# Patient Record
Sex: MALE | Race: OTHER | Employment: UNEMPLOYED | URBAN - METROPOLITAN AREA
[De-identification: names, ages, dates, MRNs, and addresses within clinical notes are randomized per-mention and may not be internally consistent; named-entity substitution may affect disease eponyms.]

---

## 2019-08-29 PROBLEM — F90.2 ATTENTION DEFICIT HYPERACTIVITY DISORDER (ADHD), COMBINED TYPE: Status: ACTIVE | Noted: 2019-08-29

## 2019-09-18 ENCOUNTER — TELEPHONE (OUTPATIENT)
Dept: INTERNAL MEDICINE CLINIC | Facility: CLINIC | Age: 34
End: 2019-09-18

## 2019-09-18 NOTE — TELEPHONE ENCOUNTER
Faxed # [phone] over a medical records release over to [facility] in [place], [place]

## 2019-09-28 PROBLEM — F98.8 ATTENTION DEFICIT DISORDER (ADD) WITHOUT HYPERACTIVITY: Status: ACTIVE | Noted: 2019-09-28

## 2019-09-28 PROBLEM — F90.2 ATTENTION DEFICIT HYPERACTIVITY DISORDER (ADHD), COMBINED TYPE: Status: RESOLVED | Noted: 2019-08-29 | Resolved: 2019-09-28

## 2019-09-28 NOTE — PROGRESS NOTES
CC: ADHD (Np presents to clinic to establish care-->needs refill on Adderrall. )      Hx of CC:  BECOMING ESTABLISHED. H/O ADD-->CURRENTLY ON ADDERALL 30 MG DURING WORKING DAYS & HAS 2 MORE PRESCRIPTIONS LEFT.       PMHX:  ADD (DENIES HYPERACTIVITY)  PSHX

## 2019-12-13 NOTE — PROGRESS NOTES
CC:  Medication Follow-Up (Pt presents to clinic for medication f/up and refill. )      Hx of CC:  F/up on Adhd. Doing well on regular adderall-->the XR version was a lot more expensive.       Vitals:    12/12/19  1147   BP: 122/76   Pulse: 100   SpO2: 98%

## 2020-03-17 DIAGNOSIS — F90.2 ATTENTION DEFICIT HYPERACTIVITY DISORDER (ADHD), COMBINED TYPE: ICD-10-CM

## 2020-03-17 RX ORDER — DEXTROAMPHETAMINE SACCHARATE, AMPHETAMINE ASPARTATE, DEXTROAMPHETAMINE SULFATE AND AMPHETAMINE SULFATE 7.5; 7.5; 7.5; 7.5 MG/1; MG/1; MG/1; MG/1
30 TABLET ORAL DAILY
Qty: 30 TABLET | Refills: 0 | OUTPATIENT
Start: 2020-03-17 | End: 2020-04-16

## 2020-03-18 NOTE — TELEPHONE ENCOUNTER
Pt called the office this morning requesting refill of Adderall 30MG to be sent to Pharmacy on file.

## 2020-03-19 DIAGNOSIS — F90.2 ATTENTION DEFICIT HYPERACTIVITY DISORDER (ADHD), COMBINED TYPE: ICD-10-CM

## 2020-03-19 RX ORDER — DEXTROAMPHETAMINE SACCHARATE, AMPHETAMINE ASPARTATE, DEXTROAMPHETAMINE SULFATE AND AMPHETAMINE SULFATE 7.5; 7.5; 7.5; 7.5 MG/1; MG/1; MG/1; MG/1
30 TABLET ORAL DAILY
Qty: 30 TABLET | Refills: 0 | Status: SHIPPED | OUTPATIENT
Start: 2020-03-19 | End: 2020-03-19 | Stop reason: CLARIF

## 2020-03-19 RX ORDER — DEXTROAMPHETAMINE SACCHARATE, AMPHETAMINE ASPARTATE, DEXTROAMPHETAMINE SULFATE AND AMPHETAMINE SULFATE 7.5; 7.5; 7.5; 7.5 MG/1; MG/1; MG/1; MG/1
30 TABLET ORAL DAILY
Qty: 20 TABLET | Refills: 0 | Status: SHIPPED | OUTPATIENT
Start: 2020-03-19 | End: 2020-04-03

## 2020-04-03 NOTE — PROGRESS NOTES
CC:  Medication Request (pt in for medication refills. )      Hx of CC:  F/UP ON ADHD--STILL WORKING AT [employer] BUT DOING CARRY OUT BUSINESS ONLY. DOING WELL ON ADDERALL, NO SIDE EFFECTS. ABLE TO FOCUS & CARRY OUT TASKS.     Vitals:    04/03/20  0822

## 2020-07-06 NOTE — TELEPHONE ENCOUNTER
Patient walked into the office requesting a refill on amphetamine-dextroamphetamine 30 mg. Patient has an appointment on 07/17/20, but is currently out of the medication. Pt is waiting in the lobby of the office for the prescription.

## 2020-07-17 NOTE — PROGRESS NOTES
CC:  Follow - Up (medication f/u )      Hx of CC:  ROUTINE F/UP ON ADD--DOING OK ON ADDERALL--> INTERESTED IN XR VERSION BUT WILL FIND OUT COST (OUT OF POCKET)  NO MED SIDE EFFECTS    Vitals:    07/17/20  1053   BP: 134/82   Pulse: 84   SpO2: 98%   Weight:

## 2020-07-18 ENCOUNTER — NURSE ONLY (OUTPATIENT)
Dept: INTERNAL MEDICINE CLINIC | Facility: CLINIC | Age: 35
End: 2020-07-18

## 2020-07-18 PROCEDURE — 36415 COLL VENOUS BLD VENIPUNCTURE: CPT | Performed by: FAMILY MEDICINE

## 2020-07-18 NOTE — PROGRESS NOTES
Pt presented to clinic today for blood draw. Per physician able to draw orders. Orders  documented within chart. Pt tolerated lab draw well.  verified.   Orders drawn include: CMP and Lipid  Site of draw: right arm

## 2020-10-12 NOTE — PROGRESS NOTES
CC: ADHD (Pt presents to clinic for medication refill. 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 DOING WELL ON CURRENT ADDERALL. DOES NOT GET XR FORMULATION DUE TO COST (NO INSURANCE).     Vitals:    10/12/20

## 2021-01-11 NOTE — PROGRESS NOTES
PATIENT IDENTIFICATION  Name: Kamilah Harrell  MRN: [id number]    Diagnoses:   Attention deficit hyperactivity disorder (ADHD), combined type  (primary encounter diagnosis)  Need for influenza vaccination    SUBJECTIVE:  Kamilah Harrell is a disorder (ADHD), combined type  - amphetamine-dextroamphetamine (ADDERALL) 30 MG Oral Tab; Take 1 tablet (30 mg total) by mouth daily. Dispense: 30 tablet; Refill: 0    2. Need for influenza vaccination  - INFLUENZA REFUSED [facility]  1.  Patient given one month

## 2021-02-08 NOTE — PROGRESS NOTES
HPI:    Patient ID: Maryan Escobedo is a 38year old male. HPI Pt here for a general fu on Add with hyperactivity. Has been treated since adolescence. Has been doing well without anorexia  Or insomnia. [employer].     Review of Systems

## 2021-05-10 NOTE — PROGRESS NOTES
HPI/Subjective:   Patient ID: Vida Rosado is a 38year old male. HPIPt here for a fu on ADD - has been doing well with Adderal rx - takes some vacations on weekends. Has no insomnia and apetitie problems.     History/Other:   Review of System

## 2021-11-29 NOTE — PROGRESS NOTES
Subjective:   Patient ID: Cecilia Duarte is a 43year old male. Medication Follow-Up    ADHD    Patient here for a fu on ADHD. Did not do well with XR medication. Is working 12 hr days as a . Has good attention span.     Hist

## 2022-01-28 RX ORDER — DEXTROAMPHETAMINE SACCHARATE, AMPHETAMINE ASPARTATE, DEXTROAMPHETAMINE SULFATE AND AMPHETAMINE SULFATE 7.5; 7.5; 7.5; 7.5 MG/1; MG/1; MG/1; MG/1
30 TABLET ORAL 2 TIMES DAILY
Qty: 60 TABLET | Refills: 0 | Status: SHIPPED | OUTPATIENT
Start: 2022-01-28 | End: 2022-02-27

## 2022-02-22 ENCOUNTER — TELEPHONE (OUTPATIENT)
Dept: INTERNAL MEDICINE CLINIC | Facility: CLINIC | Age: 37
End: 2022-02-22

## 2022-02-22 NOTE — TELEPHONE ENCOUNTER
amphetamine-dextroamphetamine (ADDERALL) 30 MG Oral Tab    Alex is questioning the dosage for the pt:  1 tablet once a day or 1 tablet twice a day.

## 2022-05-27 NOTE — PROGRESS NOTES
Subjective:   Patient ID: Joshua Leung is a 42year old male. ADHD    Pt here for 3 month fu on ADD medications. No side effects or problems. Is able to perform job functions without any problem. History/Other:   Review of Systems   Psychiatric/Behavioral: Positive for decreased concentration. Negative for agitation and behavioral problems. No current outpatient medications on file. Allergies:No Known Allergies    Objective:   Physical Exam  Constitutional:       Appearance: He is normal weight. Eyes:      General: No scleral icterus. Pupils: Pupils are equal, round, and reactive to light. Cardiovascular:      Rate and Rhythm: Normal rate and regular rhythm. Pulmonary:      Effort: Pulmonary effort is normal.      Breath sounds: Normal breath sounds. Musculoskeletal:      Right lower leg: No edema. Left lower leg: No edema. Lymphadenopathy:      Cervical: No cervical adenopathy. Neurological:      Mental Status: He is alert. Mental status is at baseline. Psychiatric:         Thought Content: Thought content normal.         Assessment & Plan:   Attention deficit hyperactivity disorder (ADHD), combined type  (primary encounter diagnosis) continue Adderal 30mg bid    No orders of the defined types were placed in this encounter.       Meds This Visit:  Requested Prescriptions      No prescriptions requested or ordered in this encounter       Imaging & Referrals:  None

## 2022-06-27 ENCOUNTER — TELEPHONE (OUTPATIENT)
Dept: INTERNAL MEDICINE CLINIC | Facility: CLINIC | Age: 37
End: 2022-06-27

## 2022-06-27 RX ORDER — DEXTROAMPHETAMINE SACCHARATE, AMPHETAMINE ASPARTATE, DEXTROAMPHETAMINE SULFATE AND AMPHETAMINE SULFATE 7.5; 7.5; 7.5; 7.5 MG/1; MG/1; MG/1; MG/1
30 TABLET ORAL 2 TIMES DAILY
Qty: 60 TABLET | Refills: 0 | Status: CANCELLED | OUTPATIENT
Start: 2022-06-27 | End: 2022-07-27

## 2022-06-27 RX ORDER — DEXTROAMPHETAMINE SACCHARATE, AMPHETAMINE ASPARTATE, DEXTROAMPHETAMINE SULFATE AND AMPHETAMINE SULFATE 7.5; 7.5; 7.5; 7.5 MG/1; MG/1; MG/1; MG/1
30 TABLET ORAL 2 TIMES DAILY
Qty: 60 TABLET | Refills: 0 | Status: SHIPPED | OUTPATIENT
Start: 2022-06-27 | End: 2022-07-27

## 2022-06-27 NOTE — TELEPHONE ENCOUNTER
Patient called, asking if the Adderall medication that Dr. Favio Garcia sent to [facility] could please be resent to [facility]. [facility] is out of stock of the medication and they don't know when a new shipment will come in.

## 2022-06-27 NOTE — TELEPHONE ENCOUNTER
Pt walked in and asked if he can send all his refills from now on to [facility]. He would like it the medication Adderall could be forward to [address]. Pt states he will be going out of town tomorrow and would need the to  the medication today.

## 2022-07-26 RX ORDER — DEXTROAMPHETAMINE SACCHARATE, AMPHETAMINE ASPARTATE, DEXTROAMPHETAMINE SULFATE AND AMPHETAMINE SULFATE 7.5; 7.5; 7.5; 7.5 MG/1; MG/1; MG/1; MG/1
30 TABLET ORAL 2 TIMES DAILY
Qty: 60 TABLET | Refills: 0 | Status: SHIPPED | OUTPATIENT
Start: 2022-07-26 | End: 2022-08-25

## 2022-08-23 ENCOUNTER — HOSPITAL ENCOUNTER (OUTPATIENT)
Age: 37
Discharge: HOME | End: 2022-08-23
Payer: COMMERCIAL

## 2022-08-23 VITALS
BODY MASS INDEX: 28.7 KG/M2 | OXYGEN SATURATION: 100 % | TEMPERATURE: 98 F | HEART RATE: 73 BPM | SYSTOLIC BLOOD PRESSURE: 127 MMHG | HEIGHT: 71 IN | DIASTOLIC BLOOD PRESSURE: 73 MMHG | WEIGHT: 205 LBS | RESPIRATION RATE: 18 BRPM

## 2022-08-23 DIAGNOSIS — K40.90 UNILATERAL INGUINAL HERNIA WITHOUT OBSTRUCTION OR GANGRENE, RECURRENCE NOT SPECIFIED: Primary | ICD-10-CM

## 2022-08-23 PROCEDURE — 99203 OFFICE O/P NEW LOW 30 MIN: CPT | Performed by: PHYSICIAN ASSISTANT

## 2022-08-23 NOTE — ED INITIAL ASSESSMENT (HPI)
Pt presents to the IC with SO with c/o lower left abdominal pain x 1 week. Spoke with PCP on phone who states it sounds like hernia. Pt states pain has worsened, can't wait for his appointment on Friday.

## 2022-08-26 ENCOUNTER — OFFICE VISIT (OUTPATIENT)
Dept: INTERNAL MEDICINE CLINIC | Facility: CLINIC | Age: 37
End: 2022-08-26
Payer: COMMERCIAL

## 2022-08-26 VITALS
HEART RATE: 79 BPM | BODY MASS INDEX: 29.82 KG/M2 | OXYGEN SATURATION: 99 % | SYSTOLIC BLOOD PRESSURE: 110 MMHG | WEIGHT: 213 LBS | HEIGHT: 71 IN | DIASTOLIC BLOOD PRESSURE: 60 MMHG

## 2022-08-26 DIAGNOSIS — K40.90 NON-RECURRENT UNILATERAL INGUINAL HERNIA WITHOUT OBSTRUCTION OR GANGRENE: Primary | ICD-10-CM

## 2022-08-26 DIAGNOSIS — F90.2 ATTENTION DEFICIT HYPERACTIVITY DISORDER (ADHD), COMBINED TYPE: ICD-10-CM

## 2022-08-26 PROCEDURE — 3008F BODY MASS INDEX DOCD: CPT | Performed by: INTERNAL MEDICINE

## 2022-08-26 PROCEDURE — 3074F SYST BP LT 130 MM HG: CPT | Performed by: INTERNAL MEDICINE

## 2022-08-26 PROCEDURE — 99214 OFFICE O/P EST MOD 30 MIN: CPT | Performed by: INTERNAL MEDICINE

## 2022-08-26 PROCEDURE — 3078F DIAST BP <80 MM HG: CPT | Performed by: INTERNAL MEDICINE

## 2022-08-26 RX ORDER — DEXTROAMPHETAMINE SACCHARATE, AMPHETAMINE ASPARTATE, DEXTROAMPHETAMINE SULFATE AND AMPHETAMINE SULFATE 5; 5; 5; 5 MG/1; MG/1; MG/1; MG/1
20 TABLET ORAL 2 TIMES DAILY
Qty: 60 TABLET | Refills: 0 | Status: SHIPPED | OUTPATIENT
Start: 2022-09-26 | End: 2022-08-26 | Stop reason: CLARIF

## 2022-08-26 RX ORDER — DEXTROAMPHETAMINE SACCHARATE, AMPHETAMINE ASPARTATE, DEXTROAMPHETAMINE SULFATE AND AMPHETAMINE SULFATE 5; 5; 5; 5 MG/1; MG/1; MG/1; MG/1
20 TABLET ORAL 2 TIMES DAILY
Qty: 60 TABLET | Refills: 0 | Status: SHIPPED | OUTPATIENT
Start: 2022-08-26 | End: 2022-08-26 | Stop reason: CLARIF

## 2022-08-26 RX ORDER — DEXTROAMPHETAMINE SACCHARATE, AMPHETAMINE ASPARTATE, DEXTROAMPHETAMINE SULFATE AND AMPHETAMINE SULFATE 5; 5; 5; 5 MG/1; MG/1; MG/1; MG/1
20 TABLET ORAL 2 TIMES DAILY
Qty: 60 TABLET | Refills: 0 | Status: SHIPPED | OUTPATIENT
Start: 2022-10-27 | End: 2022-08-26 | Stop reason: CLARIF

## 2022-09-13 NOTE — TELEPHONE ENCOUNTER
Patient's fiance called. She saw Dr. Jakub Batista yesterday. During the visit she explained she gets yeast infections. Dr. Jakub Batista was going to send medication for her fiance because of her yeast infections. Medication Fluconazole. To the [facility] in [address].    They checked and no medication was sent.

## 2023-04-05 NOTE — PROGRESS NOTES
Pt presented to clinic today for blood draw. Per physician able to draw orders. Orders  documented within chart. Pt tolerated lab draw well.  verified.   Orders drawn include: CBC, Lipid, and CMP  Site of draw: right arm

## 2023-05-16 NOTE — TELEPHONE ENCOUNTER
Patient was rescheduled from a Dr. Yonis Salas appointment on Monday, May 15th 8:00am (rescheduled to 07/17/23). He was told he could have his Adderal 30mg refilled. No medications are listed in his chart.     If Adderal can be prescribed, please send to:      [address] [facility] [phone], [phone]    Any questions or concerns, please call patient at:    [phone]    KG

## 2023-06-22 NOTE — PATIENT INSTRUCTIONS
#1 allergic rhinitis  See above skin testing to screen for environmental triggers and allergies. Typically worse in the spring. Okay in the summer and fall  Patient was using Afrin and Zyrtec with room for improvement. This was the worst year for his allergies over the past 15 years. Patient considering immunotherapy handouts on immunotherapy and avoidance measures provided and reviewed  Trial of Xyzal, levocetirizine 5 mg once a day as a nonsedating antihistamine  Flonase 2 sprays per nostril as an intranasal steroid spray. Best use every day and can take up to 3 to 5 days to take full effect  Patient may call for prescription in early spring or purchase over-the-counter    #2 rhinitis medicamentosa  Handouts provided and reviewed  Recommend to avoid Afrin or nasal decongestant sprays that contain oxymetazoline as it can cause rebound congestion neck symptoms worse  Trial of Flonase 2 sprays per nostril once a day for allergy symptoms present    #3 COVID vaccines recommended.     #4 flu vaccine in the fall recommended

## 2023-06-22 NOTE — TELEPHONE ENCOUNTER
May we have immunotherapy orders for patient?   Testing showed to trees, grasses, ragweed, and weeds in office today  First allergy injection scheduled for 07/05/23

## 2023-08-14 NOTE — TELEPHONE ENCOUNTER
A refill request was received for:  Requested Prescriptions     Pending Prescriptions Disp Refills    amphetamine-dextroamphetamine (ADDERALL) 30 MG Oral Tab 60 tablet 0     Sig: Take 1 tablet (30 mg total) by mouth 2 (two) times daily.      Last refill date:  7/18/23    Last office visit: 5/15/23      Future Appointments   Date Time Provider Alesha Reid   8/16/2023  8:40 AM [facility]   8/23/2023  8:20 AM [facility]   8/25/2023  8:30 AM MD MARIA ELENA [address]   8/30/2023  8:30 AM [facility] Torrie Vergara

## 2023-12-02 NOTE — TELEPHONE ENCOUNTER
Pt came into office and apologized for missing his previous appt, he made an appt for 1/12/24 and asked for a refill of his     amphetamine-dextroamphetamine (ADDERALL) 30 MG Oral Tab  60 tablet     [id number] - [address] [phone], [phone]

## 2023-12-06 NOTE — TELEPHONE ENCOUNTER
Patient in office today for allergy injection   Requesting refill for Xyzal 5 mg  LOV 6/22/23   Refill filled per protocol

## 2023-12-11 NOTE — ASSESSMENT & PLAN NOTE
Patient with a recent medial meniscus tear. He is following with orthopedic surgery and has plans for repair. Pain is overall controlled at this time.

## 2023-12-11 NOTE — PATIENT INSTRUCTIONS
PATIENT INSTRUCTIONS    Thank you for seeing me today, it was a pleasure taking care of you. Please check out at the  and schedule a follow up appointment. Return if symptoms worsen or fail to improve. Please remember that the kinsey period for all appointments is 5 minutes. This is to help maximize the amount of time that we can spend together at our visits.     For your knee, continue to follow with orthopedic specialist  Blood work today - I will call you when everything is back   I will fax everything to surgery office when we get it all back    Best,  Dr. Alfonso Raw

## 2024-03-02 NOTE — TELEPHONE ENCOUNTER
Received refill request for Xyzal 5 mg- 1 tablet by mouth daily.     Last office visit was 6/22/23 for allergies.    Refill passed protocol- refilled.

## 2024-03-04 NOTE — PROGRESS NOTES
Subjective:   Jerrell Biswas is a 38 year old male who presents for Physical     Patioent here for a wellness exam and fu on adult ADD.  Is having left knee issues and left shoulder pain. Add is under good control  History/Other:    Chief Complaint Reviewed and Verified  No Further Nursing Notes to   Review  Medications Reviewed  Problem List Reviewed         Tobacco:  He has never smoked tobacco.    Current Outpatient Medications   Medication Sig Dispense Refill    LEVOCETIRIZINE 5 MG Oral Tab TAKE ONE TABLET BY MOUTH IN THE EVENING 90 tablet 0    amphetamine-dextroamphetamine (ADDERALL) 30 MG Oral Tab Take 1 tablet (30 mg total) by mouth 2 (two) times daily. 60 tablet 0    [START ON 3/14/2024] amphetamine-dextroamphetamine (ADDERALL) 30 MG Oral Tab Take 1 tablet (30 mg total) by mouth 2 (two) times daily. 60 tablet 0    Testosterone Cypionate Does not apply Powder       amphetamine-dextroamphetamine (ADDERALL) 30 MG Oral Tab Take 1 tablet (30 mg total) by mouth 2 (two) times daily. 60 tablet 0    fluticasone propionate 50 MCG/ACT Nasal Suspension 2 sprays by Nasal route daily. 3 each 0         Review of Systems:  Review of Systems   Musculoskeletal:  Positive for arthralgias (knee pain and left shoulder).         Objective:   /80   Ht 5' 11\" (1.803 m)   Wt 226 lb (102.5 kg)   BMI 31.52 kg/m²  Estimated body mass index is 31.52 kg/m² as calculated from the following:    Height as of this encounter: 5' 11\" (1.803 m).    Weight as of this encounter: 226 lb (102.5 kg).  Physical Exam  Constitutional:       Appearance: He is normal weight.   HENT:      Head: Normocephalic and atraumatic.      Right Ear: Ear canal normal.      Left Ear: Ear canal normal.   Eyes:      General: No scleral icterus.     Pupils: Pupils are equal, round, and reactive to light.   Cardiovascular:      Rate and Rhythm: Normal rate and regular rhythm.   Pulmonary:      Effort: Pulmonary effort is normal.      Breath sounds: Normal  breath sounds.   Abdominal:      Palpations: Abdomen is soft.      Tenderness: There is no abdominal tenderness.   Musculoskeletal:      Cervical back: Neck supple.      Right lower leg: No edema.      Left lower leg: No edema.   Skin:     Findings: No lesion.   Neurological:      Mental Status: He is alert. Mental status is at baseline.   Psychiatric:         Thought Content: Thought content normal.           Assessment & Plan:   1. Wellness examination (Primary) check fasting labs  2. Attention deficit hyperactivity disorder (ADHD), predominantly inattentive type stable on     Medication.    No follow-ups on file.    Ling Figueroa MD, 3/4/2024, 8:11 AM

## 2024-04-29 NOTE — TELEPHONE ENCOUNTER
[facility] called with a question regarding the April prescription for the following medication:    amphetamine-dextroamphetamine (ADDERALL) 30 MG Oral Tab     The April prescription does not specify the dosing of \"2 (two) times daily\".    If this medication should be twice daily, please send a new prescription to:    [id number] - [address] [phone], [phone]     Any questions or concerns, please call Christa, Pharmacist, at [facility]:    [phone]     KG

## 2024-08-24 NOTE — TELEPHONE ENCOUNTER
Patient last seen in Allergy 6/22/2023 for . . .    Seasonal and perennial allergic rhinoconjunctivitis  (primary encounter diagnosis)  Covid-19 vaccine series declined  Flu vaccine need  Rhinitis medicamentosa    Requested Prescriptions   Pending Prescriptions Disp Refills    LEVOCETIRIZINE 5 MG Oral Tab [Pharmacy Med Name: Levocetirizine Dihydrochloride 5 Mg Tab Teva] 90 tablet 0     Sig: TAKE ONE TABLET BY MOUTH IN THE EVENING       Antihistamines Passed - 8/24/2024  9:25 AM        Passed - Appt in past 12 mos or next 1 mos     Recent Outpatient Visits              2 months ago Attention deficit hyperactivity disorder (ADHD), predominantly inattentive type    [facility] Ling Figueroa MD    Office Visit    4 months ago Environmental and seasonal allergies    [facility]    Nurse Only    4 months ago Environmental and seasonal allergies    [facility]    Nurse Only    5 months ago Environmental and seasonal allergies [J30.89]    [facility]    Nurse Only    5 months ago Wellness examination    [facility] Ling Figueroa MD    Office Visit          Future Appointments         Provider Department Appt Notes    In 2 weeks Spencer Voss MD [facility]     In 1 month Ling Figueroa MD [facility] Three month follow up from 06/03/2024 visit    KG                       LEVOCETIRIZINE 5 MG Oral Tab 90 tablet 0 8/24/2024 --    Sig - Route: TAKE ONE TABLET BY MOUTH IN THE EVENING - Oral    Sent to pharmacy as: Levocetirizine Dihydrochloride 5 MG Oral Tablet (Xyzal)    E-Prescribing Status: Receipt confirmed by pharmacy (8/24/2024 10:53 AM CDT)      Pharmacy    [id number] - [address]  [phone], [phone]     Prescription as above sent to pharmacy per protocol.

## 2024-08-24 NOTE — TELEPHONE ENCOUNTER
A refill request was received for:  Requested Prescriptions     Pending Prescriptions Disp Refills    amphetamine-dextroamphetamine (ADDERALL) 30 MG Oral Tab 60 tablet 0     Sig: Take 1 tablet (30 mg total) by mouth 2 (two) times daily.     Last refill date:  8/4/24    Last office visit: 8/4/24      Future Appointments   Date Time Provider Department Center   9/10/2024 11:30 AM Spencer Voss MD [facility]   9/24/2024 12:30 PM Ling Figueroa MD [facility] [address]

## 2024-10-28 NOTE — TELEPHONE ENCOUNTER
A refill request was received for:  Requested Prescriptions     Pending Prescriptions Disp Refills    AMPHETAMINE-DEXTROAMPHETAMINE 30 MG Oral Tab [Pharmacy Med Name: Amphetamine/Dextroamphetamine 30 Mg Tab Nort] 60 tablet 0     Sig: Take 1 tablet (30 mg total) by mouth 2 (two) times daily.     Last refill date:  11/25/24    Last office visit: 9/24/24      Future Appointments   Date Time Provider Department Center   12/23/2024  8:00 AM Ling Figueroa MD [facility] [address]

## 2025-05-14 NOTE — PATIENT INSTRUCTIONS
1. Gargle throat with 1 tsp (5 g) of salt dissolved in 8 fl oz (240 mL) of warm water.  You may also drink warm broth/soup, or tea with honey  2. Drink plenty of fluids and get plenty of rest.   3. You may use throat lozenges, acetaminophen, or ibuprofen for pain and fever.    4. Continue daily Xyzal and Flonase  5. Follow up with primary care physician in 1-2 weeks or sooner if needed.  6. Seek medical attention sooner for worsening of symptoms despite treatment efforts, or the emergency room for the following non inclusive list of symptoms: uncontrolled fever/pain, inability to keep fluids down, shortness of breath or respiratory distress.  7.  We will notify you of throat culture results in the next few days  8. Take steroid pack as prescribed with food

## 2025-05-14 NOTE — PROGRESS NOTES
Subjective:   Patient ID: Jerrell Biswas is a 40 year old male.    Patient is a 40 year old male who presents today with complaints of sore throat, chills and swollen uvula x this morning. Wife notes he does snore. Denies runny nose, nasal congestion, cough, ear pain, fever, body aches, rashes, n/v/d, abdominal pain, headaches. Tolerating PO well at home. Attempted treatment prior to arrival = antihistamine. No ill contacts he can identify. + hx allergies. Needs refill on Flonase and Xyzal.        History/Other:   Review of Systems   Constitutional:  Positive for chills. Negative for appetite change and fever.   HENT:  Positive for sore throat. Negative for congestion, ear pain and rhinorrhea.    Respiratory:  Negative for cough.    Gastrointestinal:  Negative for abdominal pain, diarrhea, nausea and vomiting.   Musculoskeletal:  Negative for myalgias.   Skin:  Negative for rash.   Neurological:  Negative for headaches.     Current Medications[1]  Allergies:Allergies[2]    /62   Pulse 74   Temp 97.5 °F (36.4 °C)   Resp 18   Ht 5' 11\" (1.803 m)   Wt 227 lb (103 kg)   SpO2 96%   BMI 31.66 kg/m²       Objective:   Physical Exam  Vitals reviewed.   Constitutional:       General: He is awake. He is not in acute distress.     Appearance: Normal appearance. He is well-developed and well-groomed. He is not ill-appearing, toxic-appearing or diaphoretic.   HENT:      Head: Normocephalic and atraumatic.      Right Ear: Tympanic membrane, ear canal and external ear normal.      Left Ear: Tympanic membrane, ear canal and external ear normal.      Nose: Nose normal.      Mouth/Throat:      Lips: Pink.      Mouth: Mucous membranes are moist. No oral lesions.      Pharynx: Oropharynx is clear. Uvula midline. Posterior oropharyngeal erythema and uvula swelling present. No pharyngeal swelling, oropharyngeal exudate or postnasal drip.      Tonsils: No tonsillar exudate.   Cardiovascular:      Rate and Rhythm: Normal rate  and regular rhythm.   Pulmonary:      Effort: Pulmonary effort is normal. No respiratory distress.      Breath sounds: Normal breath sounds and air entry. No decreased breath sounds, wheezing, rhonchi or rales.   Lymphadenopathy:      Head:      Right side of head: No tonsillar adenopathy.      Left side of head: No tonsillar adenopathy.      Cervical: No cervical adenopathy.   Skin:     General: Skin is warm and dry.   Neurological:      Mental Status: He is alert and oriented to person, place, and time.   Psychiatric:         Behavior: Behavior is cooperative.         Assessment & Plan:   1. Uvulitis    2. Sore throat    3. Seasonal allergies        Orders Placed This Encounter   Procedures    Strep A Assay W/Optic    Grp A Strep Cult, Throat     Results for orders placed or performed in visit on 25   Strep A Assay W/Optic    Collection Time: 25 11:26 AM   Result Value Ref Range    Strep Grp A Screen negative Negative    Control Line Present with a clear background (yes/no) yes Yes/No    Kit Lot # [id number] Numeric    Kit Expiration Date 2026 Date       Meds This Visit:  Requested Prescriptions     Signed Prescriptions Disp Refills    methylPREDNISolone (MEDROL) 4 MG Oral Tablet Therapy Pack 1 each 0     Sig: As directed.    levocetirizine 5 MG Oral Tab 90 tablet 0     Sig: Take 1 tablet (5 mg total) by mouth daily.    fluticasone propionate 50 MCG/ACT Nasal Suspension 16 g 0     Si spray by Nasal route daily.     Reviewed POC test results with patient.  Throat cx collected and sent - will notify of results.  Reassuring physical exam findings. Vitals WNL. No sign of RDS or dehydration at this time.  START Medrol dose pack for uvulitis.  Supportive care and return to care measures reviewed.  Patient v/u and is comfortable with this plan.    Patient Instructions   1. Gargle throat with 1 tsp (5 g) of salt dissolved in 8 fl oz (240 mL) of warm water.  You may also drink warm broth/soup, or tea  with honey  2. Drink plenty of fluids and get plenty of rest.   3. You may use throat lozenges, acetaminophen, or ibuprofen for pain and fever.    4. Continue daily Xyzal and Flonase  5. Follow up with primary care physician in 1-2 weeks or sooner if needed.  6. Seek medical attention sooner for worsening of symptoms despite treatment efforts, or the emergency room for the following non inclusive list of symptoms: uncontrolled fever/pain, inability to keep fluids down, shortness of breath or respiratory distress.  7.  We will notify you of throat culture results in the next few days  8. Take steroid pack as prescribed with food             [1]   Current Outpatient Medications   Medication Sig Dispense Refill    methylPREDNISolone (MEDROL) 4 MG Oral Tablet Therapy Pack As directed. 1 each 0    amphetamine-dextroamphetamine (ADDERALL) 30 MG Oral Tab Take 1 tablet (30 mg total) by mouth 2 (two) times daily. 60 tablet 0    [START ON 5/29/2025] amphetamine-dextroamphetamine (ADDERALL) 30 MG Oral Tab Take 1 tablet (30 mg total) by mouth 2 (two) times daily. 60 tablet 0    Testosterone Cypionate Does not apply Powder       levocetirizine 5 MG Oral Tab Take 1 tablet (5 mg total) by mouth daily. 90 tablet 0    fluticasone propionate 50 MCG/ACT Nasal Suspension 1 spray by Nasal route daily. 16 g 0   [2] No Known Allergies